# Patient Record
Sex: FEMALE | Race: BLACK OR AFRICAN AMERICAN | Employment: PART TIME | URBAN - METROPOLITAN AREA
[De-identification: names, ages, dates, MRNs, and addresses within clinical notes are randomized per-mention and may not be internally consistent; named-entity substitution may affect disease eponyms.]

---

## 2019-07-15 ENCOUNTER — HOSPITAL ENCOUNTER (OUTPATIENT)
Facility: CLINIC | Age: 49
End: 2019-07-15
Attending: OBSTETRICS & GYNECOLOGY | Admitting: OBSTETRICS & GYNECOLOGY
Payer: COMMERCIAL

## 2019-07-23 PROBLEM — N85.01 SIMPLE ENDOMETRIAL HYPERPLASIA WITHOUT ATYPIA: Status: ACTIVE | Noted: 2019-07-23

## 2019-07-23 RX ORDER — ACETAMINOPHEN 325 MG/1
975 TABLET ORAL ONCE
Status: CANCELLED | OUTPATIENT
Start: 2019-07-23 | End: 2019-07-23

## 2019-07-23 RX ORDER — CEFAZOLIN SODIUM 2 G/100ML
2 INJECTION, SOLUTION INTRAVENOUS
Status: CANCELLED | OUTPATIENT
Start: 2019-07-23

## 2019-07-23 RX ORDER — CEFAZOLIN SODIUM 1 G/3ML
1 INJECTION, POWDER, FOR SOLUTION INTRAMUSCULAR; INTRAVENOUS SEE ADMIN INSTRUCTIONS
Status: CANCELLED | OUTPATIENT
Start: 2019-07-23

## 2019-07-27 ENCOUNTER — HOSPITAL ENCOUNTER (EMERGENCY)
Facility: CLINIC | Age: 49
Discharge: HOME | End: 2019-07-27
Attending: EMERGENCY MEDICINE | Admitting: EMERGENCY MEDICINE
Payer: COMMERCIAL

## 2019-07-27 ENCOUNTER — APPOINTMENT (OUTPATIENT)
Dept: ULTRASOUND IMAGING | Facility: CLINIC | Age: 49
End: 2019-07-27
Attending: EMERGENCY MEDICINE
Payer: COMMERCIAL

## 2019-07-27 VITALS
SYSTOLIC BLOOD PRESSURE: 121 MMHG | DIASTOLIC BLOOD PRESSURE: 91 MMHG | HEIGHT: 65 IN | OXYGEN SATURATION: 97 % | HEART RATE: 90 BPM | WEIGHT: 193 LBS | RESPIRATION RATE: 16 BRPM | TEMPERATURE: 97.8 F | BODY MASS INDEX: 32.15 KG/M2

## 2019-07-27 DIAGNOSIS — N93.9 VAGINAL BLEEDING: ICD-10-CM

## 2019-07-27 LAB
ANION GAP SERPL CALCULATED.3IONS-SCNC: 6 MMOL/L (ref 3–14)
BASOPHILS # BLD AUTO: 0.1 10E9/L (ref 0–0.2)
BASOPHILS NFR BLD AUTO: 0.6 %
BUN SERPL-MCNC: 13 MG/DL (ref 7–30)
CALCIUM SERPL-MCNC: 9.2 MG/DL (ref 8.5–10.1)
CHLORIDE SERPL-SCNC: 106 MMOL/L (ref 94–109)
CO2 SERPL-SCNC: 26 MMOL/L (ref 20–32)
CREAT SERPL-MCNC: 0.86 MG/DL (ref 0.52–1.04)
DIFFERENTIAL METHOD BLD: ABNORMAL
EOSINOPHIL # BLD AUTO: 0.3 10E9/L (ref 0–0.7)
EOSINOPHIL NFR BLD AUTO: 3.3 %
ERYTHROCYTE [DISTWIDTH] IN BLOOD BY AUTOMATED COUNT: 14 % (ref 10–15)
GFR SERPL CREATININE-BSD FRML MDRD: 80 ML/MIN/{1.73_M2}
GLUCOSE SERPL-MCNC: 146 MG/DL (ref 70–99)
HCT VFR BLD AUTO: 39.1 % (ref 35–47)
HGB BLD-MCNC: 12.1 G/DL (ref 11.7–15.7)
IMM GRANULOCYTES # BLD: 0 10E9/L (ref 0–0.4)
IMM GRANULOCYTES NFR BLD: 0.5 %
LYMPHOCYTES # BLD AUTO: 2.8 10E9/L (ref 0.8–5.3)
LYMPHOCYTES NFR BLD AUTO: 33.1 %
MCH RBC QN AUTO: 27.1 PG (ref 26.5–33)
MCHC RBC AUTO-ENTMCNC: 30.9 G/DL (ref 31.5–36.5)
MCV RBC AUTO: 88 FL (ref 78–100)
MONOCYTES # BLD AUTO: 0.8 10E9/L (ref 0–1.3)
MONOCYTES NFR BLD AUTO: 8.9 %
NEUTROPHILS # BLD AUTO: 4.5 10E9/L (ref 1.6–8.3)
NEUTROPHILS NFR BLD AUTO: 53.6 %
NRBC # BLD AUTO: 0 10*3/UL
NRBC BLD AUTO-RTO: 0 /100
PLATELET # BLD AUTO: 301 10E9/L (ref 150–450)
POTASSIUM SERPL-SCNC: 4 MMOL/L (ref 3.4–5.3)
RBC # BLD AUTO: 4.47 10E12/L (ref 3.8–5.2)
SODIUM SERPL-SCNC: 138 MMOL/L (ref 133–144)
WBC # BLD AUTO: 8.4 10E9/L (ref 4–11)

## 2019-07-27 PROCEDURE — 99284 EMERGENCY DEPT VISIT MOD MDM: CPT | Mod: 25 | Performed by: EMERGENCY MEDICINE

## 2019-07-27 PROCEDURE — 85025 COMPLETE CBC W/AUTO DIFF WBC: CPT | Performed by: EMERGENCY MEDICINE

## 2019-07-27 PROCEDURE — 80048 BASIC METABOLIC PNL TOTAL CA: CPT | Performed by: EMERGENCY MEDICINE

## 2019-07-27 PROCEDURE — 93976 VASCULAR STUDY: CPT | Mod: XS

## 2019-07-27 PROCEDURE — 99283 EMERGENCY DEPT VISIT LOW MDM: CPT | Mod: Z6 | Performed by: EMERGENCY MEDICINE

## 2019-07-27 RX ORDER — LISINOPRIL 10 MG/1
10 TABLET ORAL DAILY
COMMUNITY

## 2019-07-27 ASSESSMENT — ENCOUNTER SYMPTOMS
FLANK PAIN: 0
EYES NEGATIVE: 1
SHORTNESS OF BREATH: 0
HEADACHES: 0
NECK PAIN: 0
ABDOMINAL PAIN: 1
VOMITING: 0
FEVER: 0
NAUSEA: 0
MUSCULOSKELETAL NEGATIVE: 1
PSYCHIATRIC NEGATIVE: 1

## 2019-07-27 ASSESSMENT — MIFFLIN-ST. JEOR: SCORE: 1511.32

## 2019-07-27 NOTE — ED TRIAGE NOTES
Gets regular periods normally lasting 4 days.  This cycle lasted 7 days and heavier than normal with pain to lower abdomen wrapping to back

## 2019-07-27 NOTE — DISCHARGE INSTRUCTIONS
Please make an appointment to follow up with OB/Gyn--[facility] (phone: [phone]) in 2 days. Call Monday to make an appointment.

## 2019-07-27 NOTE — ED AVS SNAPSHOT
[facility], [place], Emergency Department  [address] [place] [id number]  Phone:  [phone]                                    Ekaterina Choi   MRN: [id number]    Department:  [facility], Emergency Department   Date of Visit:  7/27/2019           After Visit Summary Signature Page    I have received my discharge instructions, and my questions have been answered. I have discussed any challenges I see with this plan with the nurse or doctor.    ..........................................................................................................................................  Patient/Patient Representative Signature      ..........................................................................................................................................  Patient Representative Print Name and Relationship to Patient    ..................................................               ................................................  Date                                   Time    ..........................................................................................................................................  Reviewed by Signature/Title    ...................................................              ..............................................  Date                                               Time          22EPIC Rev 08/18

## 2019-07-27 NOTE — ED PROVIDER NOTES
[place] EMERGENCY DEPARTMENT ([facility])  July 27, 2019    History     Chief Complaint   Patient presents with     Vaginal Bleeding     The history is provided by the patient. The history is limited by a language barrier. A  was used (Montserratian).     Ekaterina Choi is a 49 year old [place] female with a past medical history significant for endometrial hyperplasia w/o atypia, pancreatitis, and hypertension who presents to the Emergency Department for evaluation of vaginal bleeding. Patient reports that she has been having slight vaginal bleeding since September 2018, but that last Saturday (8 days PTA) she started having heavy flow (using 3 pads a day). Patient states her last period was in 2015. She states that she sometimes has associated lower abdominal pain.  History obtained through the help of an  was that the patient had an endometrial biopsy done in the clinic in [place] on July 5.  She was also placed on a 21-day course of pills to regulate her period. (medroxyprogesterone).  The biopsy was negative for cancer.  However the patient states that the pills to regulate her actually made her bleeding worse.  Before she had the pills, she would bleed a little bit here and there.  Ever since she is taking the pill she has been bleeding more heavily.  She is planning to go on a pilgrimage to [place] leaving August 2.  I have reviewed the Medications, Allergies, Past Medical and Surgical History, and Social History in the Epic system.    No past medical history on file.    No past surgical history on file.    No family history on file.    Social History     Tobacco Use     Smoking status: Not on file   Substance Use Topics     Alcohol use: Not on file     No current facility-administered medications for this encounter.      Current Outpatient Medications   Medication     cholecalciferol (VITAMIN D3) 5000 units TABS tablet     lisinopril (PRINIVIL/ZESTRIL) 10 MG tablet      No Known  "Allergies    Review of Systems   Constitutional: Negative for fever.   Eyes: Negative.    Respiratory: Negative for shortness of breath.    Cardiovascular: Negative for chest pain.   Gastrointestinal: Positive for abdominal pain ( lower). Negative for nausea and vomiting.   Genitourinary: Positive for vaginal bleeding. Negative for flank pain.   Musculoskeletal: Negative.  Negative for neck pain.   Neurological: Negative for headaches.   Psychiatric/Behavioral: Negative.    All other systems reviewed and are negative.  no chest pain, shortness of breath , no fevers, no nausea or vomiting     Physical Exam   BP: 154/89  Pulse: 95  Temp: 97.8  F (36.6  C)  Resp: 16  Height: 165.1 cm (5' 5\")  Weight: 87.5 kg (193 lb)  SpO2: 98 %      Physical Exam  Physical Exam   Constitutional:   well nourished, well developed, resting comfortably   HENT:   Head: Normocephalic and atraumatic.   Eyes: Conjunctivae are normal and not pale. Pupils are equal, round, and reactive to light.    pharynx has no erythema or exudate, mucous membranes are moist  Cardiovascular: regular rate and rhythm without murmurs or gallops  Pulmonary/Chest: Clear to auscultation bilaterally, with no wheezes or retractions. No respiratory distress.  GI: Soft with good bowel sounds.  Non-tender, non-distended, with no guarding, no rebound, no peritoneal signs.   Back:  No bony or CVA tenderness   Musculoskeletal:  no edema  Skin: Skin is warm and dry. No rash noted.   Neurological: alert and oriented to person, place, and time. Nonfocal exam  Psychiatric:  normal mood and affect.   ED Course        Procedures       12:27 PM  The patient was seen and examined by Elva Fischer in Room 07.          Critical Care time:  none            Results for orders placed or performed during the hospital encounter of 07/27/19   US Pelvis Cmplt w Transvag & Doppler LmtPel Duplex Limited    Narrative    EXAMINATION: US PELVIS COMPLETE W TRANSVAGINAL AND DOPPLER " LIMITED,  7/27/2019 1:14 PM     COMPARISON: None.    HISTORY: vaginal bleeding    TECHNIQUE: The pelvis was scanned in standard fashion with  transabdominal and transvaginal transducer(s) using both grey scale  and color Doppler techniques.    FINDINGS:  The uterus measures 9.3 x 5.4 x 4.3 cm. There is no suspicious uterine  mass. The endometrial stripe measures 11 mm. Cervix is unremarkable.  The left ovary measures 2.1 x 2.5 x 1.4 cm. There is a normal  low-resistance flow to the ovary. The right ovary was not visualized  transabdominally or transvaginally imaging.       Impression    IMPRESSION: The right ovary was not visualized, otherwise normal  pelvic ultrasound for a reproductive age female. The endometrium  measures up to 11 mm in this patient with a reported history of  endometrial hyperplasia.    I have personally reviewed the examination and initial interpretation  and I agree with the findings.    VALERIE ESTES, DO   CBC with platelets differential   Result Value Ref Range    WBC 8.4 4.0 - 11.0 10e9/L    RBC Count 4.47 3.8 - 5.2 10e12/L    Hemoglobin 12.1 11.7 - 15.7 g/dL    Hematocrit 39.1 35.0 - 47.0 %    MCV 88 78 - 100 fl    MCH 27.1 26.5 - 33.0 pg    MCHC 30.9 (L) 31.5 - 36.5 g/dL    RDW 14.0 10.0 - 15.0 %    Platelet Count 301 150 - 450 10e9/L    Diff Method Automated Method     % Neutrophils 53.6 %    % Lymphocytes 33.1 %    % Monocytes 8.9 %    % Eosinophils 3.3 %    % Basophils 0.6 %    % Immature Granulocytes 0.5 %    Nucleated RBCs 0 0 /100    Absolute Neutrophil 4.5 1.6 - 8.3 10e9/L    Absolute Lymphocytes 2.8 0.8 - 5.3 10e9/L    Absolute Monocytes 0.8 0.0 - 1.3 10e9/L    Absolute Eosinophils 0.3 0.0 - 0.7 10e9/L    Absolute Basophils 0.1 0.0 - 0.2 10e9/L    Abs Immature Granulocytes 0.0 0 - 0.4 10e9/L    Absolute Nucleated RBC 0.0    Basic metabolic panel   Result Value Ref Range    Sodium 138 133 - 144 mmol/L    Potassium 4.0 3.4 - 5.3 mmol/L    Chloride 106 94 - 109 mmol/L    Carbon  Dioxide 26 20 - 32 mmol/L    Anion Gap 6 3 - 14 mmol/L    Glucose 146 (H) 70 - 99 mg/dL    Urea Nitrogen 13 7 - 30 mg/dL    Creatinine 0.86 0.52 - 1.04 mg/dL    GFR Estimate 80 >60 mL/min/[1.73_m2]    GFR Estimate If Black >90 >60 mL/min/[1.73_m2]    Calcium 9.2 8.5 - 10.1 mg/dL        Labs Ordered and Resulted from Time of ED Arrival Up to the Time of Departure from the ED   CBC WITH PLATELETS DIFFERENTIAL - Abnormal; Notable for the following components:       Result Value    MCHC 30.9 (*)     All other components within normal limits   BASIC METABOLIC PANEL - Abnormal; Notable for the following components:    Glucose 146 (*)     All other components within normal limits   HCG QUAL URINE POCT            Assessments & Plan (with Medical Decision Making)       I have reviewed the nursing notes.  Emergency Department course:  The patient was seen and examined at 1227 pm, shortly after the patient was roomed in the ED..   There was quite some delay to obtain a Croatian , but the patient speaks English quite well.  Pelvic ultrasound shows a normal pelvic ultrasound with an endometrial stripe measuring 11 mm.  Left ovary is as noted above in the right ovary was not visualized.  Laboratory studies show an unremarkable CBC, with a WBC of 8.4 and hemoglobin of 12.1.  Basic metabolic panel shows an elevated glucose of 146 and is otherwise within normal limits.  I do not have records from the clinic at which she received her endometrial biopsy July 5 at this time, but the patient states this biopsy was negative for cancer.    I consulted OB/gyn prior to discharge.  OB/GYN will try to facilitate a follow-up appointment for next week at the [place] [facility].    The patient is a 49-year-old Croatian female who presents with vaginal bleeding.  She has had a recent endometrial biopsy that was apparently negative for cancer.  She has stable vital signs and is nontoxic-appearing with an unremarkable ultrasound. I  believe she is safe to be discharged home.  She was given the clinic number to the [facility] and should call Monday to make an appointment.  The resident on call today we will try to facilitate clinic follow-up.  I have reviewed the findings, diagnosis, plan and need for follow up with the patient.       Medication List      There are no discharge medications for this visit.         Final diagnoses:   Vaginal bleeding     IKristina, am serving as a trained medical scribe to document services personally performed by Elva Fischer MD, based on the provider's statements to me.   IElva MD, was physically present and have reviewed and verified the accuracy of this note documented by Kristina Shoemaker.  This note was created in part by the use of Dragon voice recognition dictation system. Inadvertent grammatical errors and typographical errors may still exist.  Elva Fischer MD    7/27/2019   [facility], [place], EMERGENCY DEPARTMENT     Elva Fischer MD  07/27/19 [id number]

## 2019-07-29 ENCOUNTER — TELEPHONE (OUTPATIENT)
Dept: OBGYN | Facility: CLINIC | Age: 49
End: 2019-07-29

## 2019-07-29 NOTE — TELEPHONE ENCOUNTER
Left message with Sudanese  trying to get patient scheduled with resident this week. Will await call back to get her scheduled.

## 2019-07-29 NOTE — TELEPHONE ENCOUNTER
----- Message from Sherrie Olivares MD sent at 7/27/2019  2:56 PM CDT -----  Hi there,    This patient was seen in the ED on 7/27 for vaginal bleeding. We were not consulted but the ED provider Dr. Adams consulted me to see if I could help expedite this patient being seen in clinic. She is going out of town on a pilgrimage on 8/2. Is there anyway for her to be seen in clinic prior to this?    Thanks!  Nati Olivares  OB resident